# Patient Record
Sex: MALE | Race: AMERICAN INDIAN OR ALASKA NATIVE
[De-identification: names, ages, dates, MRNs, and addresses within clinical notes are randomized per-mention and may not be internally consistent; named-entity substitution may affect disease eponyms.]

---

## 2018-01-06 ENCOUNTER — HOSPITAL ENCOUNTER (EMERGENCY)
Dept: HOSPITAL 5 - ED | Age: 28
Discharge: HOME | End: 2018-01-06
Payer: COMMERCIAL

## 2018-01-06 VITALS — DIASTOLIC BLOOD PRESSURE: 57 MMHG | SYSTOLIC BLOOD PRESSURE: 105 MMHG

## 2018-01-06 DIAGNOSIS — R56.9: ICD-10-CM

## 2018-01-06 DIAGNOSIS — R51: Primary | ICD-10-CM

## 2018-01-06 LAB
BUN SERPL-MCNC: 12 MG/DL (ref 9–20)
BUN/CREAT SERPL: 12 %
CALCIUM SERPL-MCNC: 8.9 MG/DL (ref 8.4–10.2)
HCT VFR BLD CALC: 44.2 % (ref 35.5–45.6)
HEMOLYSIS INDEX: 15
HGB BLD-MCNC: 14.9 GM/DL (ref 11.8–15.2)
MCH RBC QN AUTO: 32 PG (ref 28–32)
MCHC RBC AUTO-ENTMCNC: 34 % (ref 32–34)
MCV RBC AUTO: 94 FL (ref 84–94)
PLATELET # BLD: 170 K/MM3 (ref 140–440)
RBC # BLD AUTO: 4.7 M/MM3 (ref 3.65–5.03)

## 2018-01-06 PROCEDURE — 96375 TX/PRO/DX INJ NEW DRUG ADDON: CPT

## 2018-01-06 PROCEDURE — 96374 THER/PROPH/DIAG INJ IV PUSH: CPT

## 2018-01-06 PROCEDURE — 84146 ASSAY OF PROLACTIN: CPT

## 2018-01-06 PROCEDURE — 93005 ELECTROCARDIOGRAM TRACING: CPT

## 2018-01-06 PROCEDURE — 36415 COLL VENOUS BLD VENIPUNCTURE: CPT

## 2018-01-06 PROCEDURE — 99284 EMERGENCY DEPT VISIT MOD MDM: CPT

## 2018-01-06 PROCEDURE — 82550 ASSAY OF CK (CPK): CPT

## 2018-01-06 PROCEDURE — 96361 HYDRATE IV INFUSION ADD-ON: CPT

## 2018-01-06 PROCEDURE — 93010 ELECTROCARDIOGRAM REPORT: CPT

## 2018-01-06 PROCEDURE — 80048 BASIC METABOLIC PNL TOTAL CA: CPT

## 2018-01-06 PROCEDURE — 85027 COMPLETE CBC AUTOMATED: CPT

## 2018-01-06 NOTE — EMERGENCY DEPARTMENT REPORT
HPI





- General


Chief Complaint: Seizure


Time Seen by Provider: 01/06/18 08:31





- HPI


HPI: 








The patient is a 27-year-old male whom presents for evaluation of seizures.  

The patient has a history of epilepsy.  Per EMS the patient's family reported 

witnessing a seizure 30 minutes prior to arrival.  The patient complains of a 

constant mild achy headache since the seizure, currently 7/10 in severity, 

worse with movement. The patient denies fever, head injury, neck pain, neck 

stiffness, vision or hearing changes, smell or taste changes, paresthesias, 

facial drooping, focal motor deficit, slurred speech, urine or bowel 

incontinence or retention, or other focal neurological deficit.








ED Past Medical Hx





- Past Medical History


Hx Seizures: Yes


Hx Asthma: Yes





- Social History


Smoking Status: Never Smoker


Substance Use Type: None





- Medications


Home Medications: 


 Home Medications











 Medication  Instructions  Recorded  Confirmed  Last Taken  Type


 


ALBUTEROL Inhaler [ProAir HFA 2 puff IH QID PRN #1 inhalation 04/24/16 06/22/16 

Unknown Rx





Inhaler]     


 


levETIRAcetam [Keppra TAB] 1,000 mg PO BID #60 tab 07/24/16  Unknown Rx


 


Butalb/Acetamin/Caff -40 1 each PO Q4H PRN #14 tablet 01/06/18  Unknown Rx





[Fioricet]     














ED Review of Systems


ROS: 


Stated complaint: SEIZURE


Other details as noted in HPI








Constitutional: denies: fever


ENT: denies: throat or neck pain


Respiratory: denies: cough, shortness of breath


Cardiovascular: denies: chest pain


Endocrine: denies unexplained weight loss or gain


Gastrointestinal: denies: abdominal pain, nausea


Genitourinary: denies: dysuria


Musculoskeletal: denies: leg swelling


Skin: denies: rash


Neurological: reports seizure and headache


Hematological/Lymphatic: denies: easy bleeding or easy bruising  


Psych: denies sadness or hopelessness








Physical Exam





- Physical Exam


Vital Signs: 


 Vital Signs











  01/06/18 01/06/18





  08:23 08:35


 


Temperature 98.2 F 


 


Pulse Rate 86 


 


Respiratory 13 13





Rate  


 


Blood Pressure 134/83 


 


Blood Pressure 134/84 





[Left]  


 


O2 Sat by Pulse 96 96





Oximetry  











Physical Exam: 








General: well-nourished, well-developed, no acute distress


Head: Normocephalic, atraumatic


Eyes: normal sclera, PERRL, EOM intact


ENT: Mucous membranes are pale and dry


Neck: trachea midline, neck supple, No neck stiffness, no cervical adenopathy


Respiratory: Breath sounds equal bilaterally, no wheezing, rales, or rhonchi


Cardio: S1 and S2 present, no murmurs, rubs, gallops, capillary refill is 

delayed


Abdomen: Normoactive bowel sounds, soft abdomen, no rigidity, no guarding or 

rebound tenderness


Musc: No pitting edema


Skin: No rash


Neuro: alert oriented x4, normal cognition, speech normal, no facial drooping, 

no uvula or tongue deviation on protrusion, no deficit with rotation of neck or 

shoulder shrug, no obvious gross motor deficit in the upper or lower 

extremities with flexion or extension at the shoulder, elbow, wrist, hip, knee, 

or ankle bilaterally, no obvious gross sensation deficit to crude touch or 2 pt 

discrimination, 2+ symmetric reflexes on DTR testing, no coordination deficit 

with finger-to-nose or heel-to-shin testing, Babinski downgoing


Psych: Normal affect








ED Course


 Vital Signs











  01/06/18 01/06/18





  08:23 08:35


 


Temperature 98.2 F 


 


Pulse Rate 86 


 


Respiratory 13 13





Rate  


 


Blood Pressure 134/83 


 


Blood Pressure 134/84 





[Left]  


 


O2 Sat by Pulse 96 96





Oximetry  














ED Medical Decision Making





- Lab Data


Result diagrams: 


 01/06/18 08:35





 01/06/18 08:35





- Medical Decision Making








The patient was seen and examined by myself.  The patient is placed on a 

cardiac monitor and continuous pulse ox. On initial evaluation, the patient was 

found to be in no distress. Evaluation orders were placed.  The patient given 1 

L normal saline fluid bolus treatment of dehydration.  The patient is given 1 

mg of Ativan and 1g Keppra for treatment of his seizure.  Lab results are 

grossly concerning. The patient was reevaluated and reported that their 

symptoms were markedly improved.  The patient was observed in the emergency 

department for greater than 2 hours without any further seizure-like activity. 

The patient is stable for discharge with outpatient follow-up.  The patient is 

given follow-up and return instructions.  The patient expressed understanding 

and agreed with the plan.  The patient is discharged in stable condition.





Critical care attestation.: 


If time is entered above; I have spent that time in minutes in the direct care 

of this critically ill patient, excluding procedure time.








ED Disposition


Clinical Impression: 


 Seizures





Acute nonintractable headache


Qualifiers:


 Headache type: unspecified Qualified Code(s): R51 - Headache





Disposition: DC-01 TO HOME OR SELFCARE


Is pt being admited?: No


Does the pt Need Aspirin: No


Condition: Stable


Instructions:  Acute Headache (ED), Epilepsy (ED)


Prescriptions: 


Butalb/Acetamin/Caff -40 [Fioricet] 1 each PO Q4H PRN #14 tablet


 PRN Reason: Headache


Referrals: 


PRIMARY CARE,MD [Primary Care Provider] - 3-5 Days


Time of Disposition: 13:26